# Patient Record
Sex: FEMALE | Race: WHITE
[De-identification: names, ages, dates, MRNs, and addresses within clinical notes are randomized per-mention and may not be internally consistent; named-entity substitution may affect disease eponyms.]

---

## 2021-10-03 ENCOUNTER — HOSPITAL ENCOUNTER (EMERGENCY)
Dept: HOSPITAL 4 - SED | Age: 41
LOS: 1 days | Discharge: HOME | End: 2021-10-04
Payer: COMMERCIAL

## 2021-10-03 VITALS — SYSTOLIC BLOOD PRESSURE: 150 MMHG

## 2021-10-03 VITALS — BODY MASS INDEX: 33.6 KG/M2 | HEIGHT: 71 IN | WEIGHT: 240 LBS

## 2021-10-03 DIAGNOSIS — K21.9: ICD-10-CM

## 2021-10-03 DIAGNOSIS — Z79.899: ICD-10-CM

## 2021-10-03 DIAGNOSIS — R60.0: Primary | ICD-10-CM

## 2021-10-03 LAB
ALBUMIN SERPL BCP-MCNC: 4 G/DL (ref 3.4–4.8)
ALT SERPL W P-5'-P-CCNC: 27 U/L (ref 12–78)
ANION GAP SERPL CALCULATED.3IONS-SCNC: 9 MMOL/L (ref 5–15)
AST SERPL W P-5'-P-CCNC: 27 U/L (ref 10–37)
BASOPHILS # BLD AUTO: 0.1 K/UL (ref 0–0.2)
BASOPHILS NFR BLD AUTO: 1.2 % (ref 0–2)
BILIRUB SERPL-MCNC: 0.6 MG/DL (ref 0–1)
BUN SERPL-MCNC: 8 MG/DL (ref 8–21)
CALCIUM SERPL-MCNC: 9.2 MG/DL (ref 8.4–11)
CHLORIDE SERPL-SCNC: 103 MMOL/L (ref 98–107)
CREAT SERPL-MCNC: 0.9 MG/DL (ref 0.55–1.3)
EOSINOPHIL # BLD AUTO: 0.3 K/UL (ref 0–0.4)
EOSINOPHIL NFR BLD AUTO: 3.3 % (ref 0–4)
ERYTHROCYTE [DISTWIDTH] IN BLOOD BY AUTOMATED COUNT: 13.4 % (ref 9–15)
GFR SERPL CREATININE-BSD FRML MDRD: 88 ML/MIN (ref 90–?)
GLUCOSE SERPL-MCNC: 94 MG/DL (ref 70–99)
HCT VFR BLD AUTO: 37.9 % (ref 36–48)
HGB BLD-MCNC: 13 G/DL (ref 12–16)
INR PPP: 1.1 (ref 0.8–1.2)
LYMPHOCYTES # BLD AUTO: 2.1 K/UL (ref 1–5.5)
LYMPHOCYTES NFR BLD AUTO: 26.6 % (ref 20.5–51.5)
MCH RBC QN AUTO: 31 PG (ref 27–31)
MCHC RBC AUTO-ENTMCNC: 35 % (ref 32–36)
MCV RBC AUTO: 89 FL (ref 79–98)
MONOCYTES # BLD MANUAL: 0.4 K/UL (ref 0–1)
MONOCYTES # BLD MANUAL: 5.4 % (ref 1.7–9.3)
NEUTROPHILS # BLD AUTO: 5 K/UL (ref 1.8–7.7)
NEUTROPHILS NFR BLD AUTO: 63.5 % (ref 40–70)
PLATELET # BLD AUTO: 329 K/UL (ref 130–430)
POTASSIUM SERPL-SCNC: 2.7 MMOL/L (ref 3.5–5.1)
PROTHROMBIN TIME: 11.3 SECS (ref 9.5–12.5)
RBC # BLD AUTO: 4.26 MIL/UL (ref 4.2–6.2)
SODIUM SERPLBLD-SCNC: 142 MMOL/L (ref 136–145)
WBC # BLD AUTO: 7.8 K/UL (ref 4.8–10.8)

## 2021-10-04 VITALS — SYSTOLIC BLOOD PRESSURE: 150 MMHG

## 2021-12-13 ENCOUNTER — HOSPITAL ENCOUNTER (EMERGENCY)
Dept: HOSPITAL 4 - SED | Age: 41
Discharge: HOME | End: 2021-12-13
Payer: COMMERCIAL

## 2021-12-13 VITALS — WEIGHT: 245 LBS | HEIGHT: 71 IN | BODY MASS INDEX: 34.3 KG/M2

## 2021-12-13 VITALS — SYSTOLIC BLOOD PRESSURE: 154 MMHG

## 2021-12-13 VITALS — SYSTOLIC BLOOD PRESSURE: 133 MMHG

## 2021-12-13 DIAGNOSIS — L03.115: Primary | ICD-10-CM

## 2021-12-13 DIAGNOSIS — Z79.899: ICD-10-CM

## 2021-12-13 DIAGNOSIS — K21.9: ICD-10-CM

## 2021-12-13 PROCEDURE — 99283 EMERGENCY DEPT VISIT LOW MDM: CPT

## 2021-12-13 PROCEDURE — 96372 THER/PROPH/DIAG INJ SC/IM: CPT

## 2021-12-13 NOTE — NUR
Patient given written and verbal discharge instructions and verbalizes 
understanding.  ER MD discussed with patient the results and treatment 
provided. Patient in stable condition. ID arm band removed. 

 Patient educated on pain management and to follow up with PMD. Pain Scale .

Opportunity for questions provided and answered. Medication side effect fact 
sheet provided.
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
## 2021-12-16 ENCOUNTER — HOSPITAL ENCOUNTER (EMERGENCY)
Dept: HOSPITAL 4 - SED | Age: 41
Discharge: HOME | End: 2021-12-16
Payer: COMMERCIAL

## 2021-12-16 VITALS — HEIGHT: 71 IN | WEIGHT: 220 LBS | BODY MASS INDEX: 30.8 KG/M2

## 2021-12-16 VITALS — SYSTOLIC BLOOD PRESSURE: 160 MMHG

## 2021-12-16 VITALS — SYSTOLIC BLOOD PRESSURE: 168 MMHG

## 2021-12-16 DIAGNOSIS — F31.9: ICD-10-CM

## 2021-12-16 DIAGNOSIS — Z79.899: ICD-10-CM

## 2021-12-16 DIAGNOSIS — Z76.0: ICD-10-CM

## 2021-12-16 DIAGNOSIS — K21.9: ICD-10-CM

## 2021-12-16 DIAGNOSIS — F41.9: Primary | ICD-10-CM

## 2021-12-26 ENCOUNTER — HOSPITAL ENCOUNTER (INPATIENT)
Dept: HOSPITAL 4 - SED | Age: 41
LOS: 5 days | Discharge: HOME | DRG: 392 | End: 2021-12-31
Attending: FAMILY MEDICINE | Admitting: FAMILY MEDICINE
Payer: COMMERCIAL

## 2021-12-26 VITALS — BODY MASS INDEX: 30.1 KG/M2 | HEIGHT: 71 IN | WEIGHT: 215 LBS

## 2021-12-26 DIAGNOSIS — I10: ICD-10-CM

## 2021-12-26 DIAGNOSIS — Z20.822: ICD-10-CM

## 2021-12-26 DIAGNOSIS — Z91.19: ICD-10-CM

## 2021-12-26 DIAGNOSIS — Z79.899: ICD-10-CM

## 2021-12-26 DIAGNOSIS — K52.9: Primary | ICD-10-CM

## 2021-12-26 DIAGNOSIS — F41.9: ICD-10-CM

## 2021-12-26 DIAGNOSIS — N39.0: ICD-10-CM

## 2021-12-26 DIAGNOSIS — E87.2: ICD-10-CM

## 2021-12-26 DIAGNOSIS — F31.9: ICD-10-CM

## 2021-12-26 DIAGNOSIS — Z59.00: ICD-10-CM

## 2021-12-26 DIAGNOSIS — L03.90: ICD-10-CM

## 2021-12-26 DIAGNOSIS — K21.9: ICD-10-CM

## 2021-12-26 LAB
ALBUMIN SERPL BCP-MCNC: 3.8 G/DL (ref 3.4–4.8)
ALT SERPL W P-5'-P-CCNC: 36 U/L (ref 12–78)
AMYLASE SERPL-CCNC: 77 U/L (ref 0–100)
ANION GAP SERPL CALCULATED.3IONS-SCNC: 13 MMOL/L (ref 5–15)
AST SERPL W P-5'-P-CCNC: 34 U/L (ref 10–37)
BASOPHILS # BLD AUTO: 0.1 K/UL (ref 0–0.2)
BASOPHILS NFR BLD AUTO: 0.3 % (ref 0–2)
BILIRUB SERPL-MCNC: 0.5 MG/DL (ref 0–1)
BUN SERPL-MCNC: 9 MG/DL (ref 8–21)
CALCIUM SERPL-MCNC: 9.1 MG/DL (ref 8.4–11)
CHLORIDE SERPL-SCNC: 102 MMOL/L (ref 98–107)
CREAT SERPL-MCNC: 0.85 MG/DL (ref 0.55–1.3)
CRP SERPL-MCNC: < 0.2 MG/DL (ref 0–0.5)
EOSINOPHIL # BLD AUTO: 0.1 K/UL (ref 0–0.4)
EOSINOPHIL NFR BLD AUTO: 0.3 % (ref 0–4)
ERYTHROCYTE [DISTWIDTH] IN BLOOD BY AUTOMATED COUNT: 14 % (ref 9–15)
GFR SERPL CREATININE-BSD FRML MDRD: 94 ML/MIN (ref 90–?)
GLUCOSE SERPL-MCNC: 155 MG/DL (ref 70–99)
HCT VFR BLD AUTO: 39.6 % (ref 36–48)
HGB BLD-MCNC: 13.2 G/DL (ref 12–16)
LACTATE SERPL-SCNC: 160 U/L (ref 81–234)
LIPASE SERPL-CCNC: 147 U/L (ref 73–393)
LYMPHOCYTES # BLD AUTO: 1.5 K/UL (ref 1–5.5)
LYMPHOCYTES NFR BLD AUTO: 9.2 % (ref 20.5–51.5)
MCH RBC QN AUTO: 29 PG (ref 27–31)
MCHC RBC AUTO-ENTMCNC: 33 % (ref 32–36)
MCV RBC AUTO: 88 FL (ref 79–98)
MONOCYTES # BLD MANUAL: 0.5 K/UL (ref 0–1)
MONOCYTES # BLD MANUAL: 2.8 % (ref 1.7–9.3)
NEUTROPHILS # BLD AUTO: 14.4 K/UL (ref 1.8–7.7)
NEUTROPHILS NFR BLD AUTO: 87.4 % (ref 40–70)
PLATELET # BLD AUTO: 372 K/UL (ref 130–430)
POTASSIUM SERPL-SCNC: 3.2 MMOL/L (ref 3.5–5.1)
RBC # BLD AUTO: 4.5 MIL/UL (ref 4.2–6.2)
SODIUM SERPLBLD-SCNC: 137 MMOL/L (ref 136–145)
WBC # BLD AUTO: 16.5 K/UL (ref 4.8–10.8)

## 2021-12-26 PROCEDURE — G0482 DRUG TEST DEF 15-21 CLASSES: HCPCS

## 2021-12-26 SDOH — ECONOMIC STABILITY - HOUSING INSECURITY: HOMELESSNESS UNSPECIFIED: Z59.00

## 2021-12-26 NOTE — NUR
PT BIB MOTHER FOR NAUSEA, VOMITING AND DIARHEA SINCE 1730 TODAY. PT ATE IN N 
OUT AT AROUND 1700 AND STARTED HAVING SEVERE DIARHEA AND VOMITING. PT STATES 
OVER 5 VOMITING EPISODES AND DIARHEA FOR 6 EPISODES. A&OX4, -CP, -SOB.

## 2021-12-27 VITALS — SYSTOLIC BLOOD PRESSURE: 151 MMHG

## 2021-12-27 VITALS — SYSTOLIC BLOOD PRESSURE: 158 MMHG

## 2021-12-27 VITALS — SYSTOLIC BLOOD PRESSURE: 148 MMHG

## 2021-12-27 LAB
AMORPH SED URNS QL MICRO: (no result) /HPF
AMPHETAMINES UR QL SCN: NEGATIVE
APPEARANCE UR: (no result)
BACTERIA URNS QL MICRO: (no result) /HPF
BARBITURATES UR QL SCN: NEGATIVE
BENZODIAZ UR QL SCN: POSITIVE
BILIRUB UR QL STRIP: NEGATIVE
BZE UR QL SCN: NEGATIVE
CANNABINOIDS UR QL SCN: POSITIVE
COLOR UR: YELLOW
GLUCOSE UR STRIP-MCNC: NEGATIVE MG/DL
HGB UR QL STRIP: NEGATIVE
KETONES UR STRIP-MCNC: NEGATIVE MG/DL
LEUKOCYTE ESTERASE UR QL STRIP: NEGATIVE
METHADONE UR-SCNC: NEGATIVE UMOL/L
METHAMPHET UR-SCNC: NEGATIVE UMOL/L
NITRITE UR QL STRIP: NEGATIVE
OPIATES UR QL SCN: NEGATIVE
OXYCODONE SERPL-MCNC: NEGATIVE NG/ML
PCP UR QL SCN: NEGATIVE
PH UR STRIP: 8 [PH] (ref 5–8)
PROT UR QL STRIP: NEGATIVE
RBC #/AREA URNS HPF: (no result) /HPF (ref 0–3)
SP GR UR STRIP: 1.02 (ref 1–1.03)
TRICYCLICS UR-MCNC: NEGATIVE NG/ML
URINE PROPOXYPHENE SCREEN: NEGATIVE
UROBILINOGEN UR STRIP-MCNC: 0.2 MG/DL (ref 0.2–1)
WBC #/AREA URNS HPF: (no result) /HPF (ref 0–3)

## 2021-12-27 RX ADMIN — ENOXAPARIN SODIUM SCH MG: 40 INJECTION SUBCUTANEOUS at 20:45

## 2021-12-27 RX ADMIN — ONDANSETRON PRN MG: 2 INJECTION INTRAMUSCULAR; INTRAVENOUS at 20:42

## 2021-12-27 RX ADMIN — DEXTROSE MONOHYDRATE SCH MLS/HR: 50 INJECTION, SOLUTION INTRAVENOUS at 15:56

## 2021-12-27 RX ADMIN — VANCOMYCIN HYDROCHLORIDE SCH MG: 1 INJECTION, POWDER, LYOPHILIZED, FOR SOLUTION INTRAVENOUS at 23:15

## 2021-12-27 RX ADMIN — POTASSIUM CHLORIDE, DEXTROSE MONOHYDRATE AND SODIUM CHLORIDE SCH MLS/HR: 150; 5; 450 INJECTION, SOLUTION INTRAVENOUS at 11:12

## 2021-12-27 RX ADMIN — Medication SCH MLS/HR: at 20:57

## 2021-12-27 RX ADMIN — POTASSIUM CHLORIDE, DEXTROSE MONOHYDRATE AND SODIUM CHLORIDE SCH MLS/HR: 150; 5; 450 INJECTION, SOLUTION INTRAVENOUS at 05:22

## 2021-12-27 RX ADMIN — Medication SCH MLS/HR: at 15:56

## 2021-12-27 RX ADMIN — TRAZODONE HYDROCHLORIDE PRN MG: 50 TABLET ORAL at 20:55

## 2021-12-27 NOTE — NUR
REPORT RECEIVED, PT WITH EYES CLOSED, EASILY AROUSBALE TO VOICE. PT DENIES ANY 
DIZZINES/SOB OR CP. SKIN W/D/I, DENIES ANY PAIN, BUT REPORTS MILD NAUSEA. 
REPORTTS LAST TIME SHE VOMITTED WAS LAST NIGHT. RESP EVEN AND UNLABORED, ON RA 
@95%. REPOSITIOND IN BED, IV FLUIDS INFUSING VIA PUMP AS ORDERED. SAFTEY 
PRECAUTIONS IN PLACE, WILL CONT TO MONITOR.

## 2021-12-27 NOTE — NUR
APPEARED ALERT, ORIENTED, AND APPROPRIATE.  ASKING FOR FOOD, OFFERED LIQUID DIET TO BEGIN 
WITH, DID NOT EAT, JUST DRANK SOME JUICE.

IVF STARTS RIGHT  AWAY ON ARRIVAL, CLAIMED SHE HAS HAD DIARRHEA X 3 DAYS,  NEEDS STOOL 
SPECIMEN, SO FAR SHE DOES NOT HAVE A BM YET,

PER ID, ALL ABX ( ROCEPHIN, AND FLAGYL IVPB INITIATED).  PATIENT DID REQUEST  TRAZODONE FOR 
TONITE, WHICH HAS BEEN ORDERED, PATIENT MADE AWARE.

ON BED REST

## 2021-12-27 NOTE — NUR
Opening notes



Pt AAOx 4, VSS, afebrile.  No s/s distress noted.  Pt has N/V, yellow emesis noted.  Pt on 
clear liquid diet.  Will medicate with Zofran as needed.  Pt denies diarrhea.  IVF infusing 
at ordered rate L. AC 20G clear and patent.  Call light within reach.  To monitor.

## 2021-12-27 NOTE — NUR
Patient will be admitted to care of D JEANINE.  Admitted to  unit.  Will go to 
room .  Belongings list completed.  Complete and up to date summary report 
printed. SBAR report to be given at bedside with opportunity for questions.

## 2021-12-27 NOTE — NUR
pt being transferred by [facility] ambulance to [place] for a ct scan with 
contrast

## 2021-12-27 NOTE — NUR
CONSULTATION PAGED/CALLED

Reason for Consultation: []  ACUTE COLITIS

Person Who was Notified: []  COLTON

Consulting Physician: []  DR MCGRATH

Consultant Specialty: []  ID

Ordering Physician: []  DR EWING

## 2021-12-27 NOTE — NUR
REPORT GIVEN TO RICHARD LARSON, UPDATED ON STATUS, LABS AND VITALS. PT STABLE FR 
TRANSFER. VSS. PT GOT UP TO BATHROOM WITH STEADY GAIT. DENIES ANY 
DIARRHEA.VOMITIING.

## 2021-12-27 NOTE — NUR
PATI NOTIFIED [facility], WHICH IS NOT FORMULARY IN THIS HOSPITAL.  CALLED PATIENT'S 
MOTHER TO BRING IT TO THE HOSPITAL.  PATIENT REALLY NEEDS IT

## 2021-12-28 VITALS — SYSTOLIC BLOOD PRESSURE: 148 MMHG

## 2021-12-28 VITALS — SYSTOLIC BLOOD PRESSURE: 168 MMHG

## 2021-12-28 VITALS — SYSTOLIC BLOOD PRESSURE: 173 MMHG

## 2021-12-28 VITALS — SYSTOLIC BLOOD PRESSURE: 109 MMHG

## 2021-12-28 VITALS — SYSTOLIC BLOOD PRESSURE: 134 MMHG

## 2021-12-28 LAB
ANION GAP SERPL CALCULATED.3IONS-SCNC: 11 MMOL/L (ref 5–15)
BASOPHILS # BLD AUTO: 0.1 K/UL (ref 0–0.2)
BASOPHILS NFR BLD AUTO: 0.3 % (ref 0–2)
BUN SERPL-MCNC: 5 MG/DL (ref 8–21)
CALCIUM SERPL-MCNC: 8.4 MG/DL (ref 8.4–11)
CHLORIDE SERPL-SCNC: 101 MMOL/L (ref 98–107)
CREAT SERPL-MCNC: 0.55 MG/DL (ref 0.55–1.3)
EOSINOPHIL # BLD AUTO: 0 K/UL (ref 0–0.4)
EOSINOPHIL NFR BLD AUTO: 0.1 % (ref 0–4)
ERYTHROCYTE [DISTWIDTH] IN BLOOD BY AUTOMATED COUNT: 14.1 % (ref 9–15)
GFR SERPL CREATININE-BSD FRML MDRD: 156 ML/MIN (ref 90–?)
GLUCOSE SERPL-MCNC: 121 MG/DL (ref 70–99)
HCT VFR BLD AUTO: 36.9 % (ref 36–48)
HGB BLD-MCNC: 12.5 G/DL (ref 12–16)
LYMPHOCYTES # BLD AUTO: 1.6 K/UL (ref 1–5.5)
LYMPHOCYTES NFR BLD AUTO: 10.3 % (ref 20.5–51.5)
MCH RBC QN AUTO: 30 PG (ref 27–31)
MCHC RBC AUTO-ENTMCNC: 34 % (ref 32–36)
MCV RBC AUTO: 87 FL (ref 79–98)
MONOCYTES # BLD MANUAL: 0.6 K/UL (ref 0–1)
MONOCYTES # BLD MANUAL: 3.9 % (ref 1.7–9.3)
NEUTROPHILS # BLD AUTO: 13.7 K/UL (ref 1.8–7.7)
NEUTROPHILS NFR BLD AUTO: 85.4 % (ref 40–70)
PLATELET # BLD AUTO: 324 K/UL (ref 130–430)
POTASSIUM SERPL-SCNC: 3.1 MMOL/L (ref 3.5–5.1)
RBC # BLD AUTO: 4.23 MIL/UL (ref 4.2–6.2)
SODIUM SERPLBLD-SCNC: 137 MMOL/L (ref 136–145)
WBC # BLD AUTO: 16 K/UL (ref 4.8–10.8)

## 2021-12-28 RX ADMIN — Medication SCH MLS/HR: at 06:07

## 2021-12-28 RX ADMIN — VANCOMYCIN HYDROCHLORIDE SCH MG: 1 INJECTION, POWDER, LYOPHILIZED, FOR SOLUTION INTRAVENOUS at 11:05

## 2021-12-28 RX ADMIN — VANCOMYCIN HYDROCHLORIDE SCH MG: 1 INJECTION, POWDER, LYOPHILIZED, FOR SOLUTION INTRAVENOUS at 06:09

## 2021-12-28 RX ADMIN — POTASSIUM CHLORIDE, DEXTROSE MONOHYDRATE AND SODIUM CHLORIDE SCH MLS/HR: 150; 5; 450 INJECTION, SOLUTION INTRAVENOUS at 06:08

## 2021-12-28 RX ADMIN — POTASSIUM CHLORIDE, DEXTROSE MONOHYDRATE AND SODIUM CHLORIDE SCH MLS/HR: 150; 5; 450 INJECTION, SOLUTION INTRAVENOUS at 23:48

## 2021-12-28 RX ADMIN — ONDANSETRON PRN MG: 2 INJECTION INTRAMUSCULAR; INTRAVENOUS at 10:59

## 2021-12-28 RX ADMIN — POTASSIUM CHLORIDE, DEXTROSE MONOHYDRATE AND SODIUM CHLORIDE SCH MLS/HR: 150; 5; 450 INJECTION, SOLUTION INTRAVENOUS at 15:47

## 2021-12-28 RX ADMIN — Medication SCH MLS/HR: at 21:12

## 2021-12-28 RX ADMIN — ENOXAPARIN SODIUM SCH MG: 40 INJECTION SUBCUTANEOUS at 21:13

## 2021-12-28 RX ADMIN — VANCOMYCIN HYDROCHLORIDE SCH MG: 1 INJECTION, POWDER, LYOPHILIZED, FOR SOLUTION INTRAVENOUS at 23:15

## 2021-12-28 RX ADMIN — DEXTROSE MONOHYDRATE SCH MLS/HR: 50 INJECTION, SOLUTION INTRAVENOUS at 12:02

## 2021-12-28 RX ADMIN — VANCOMYCIN HYDROCHLORIDE SCH MG: 1 INJECTION, POWDER, LYOPHILIZED, FOR SOLUTION INTRAVENOUS at 17:58

## 2021-12-28 RX ADMIN — ONDANSETRON PRN MG: 2 INJECTION INTRAMUSCULAR; INTRAVENOUS at 01:43

## 2021-12-28 RX ADMIN — VANCOMYCIN HYDROCHLORIDE SCH MG: 1 INJECTION, POWDER, LYOPHILIZED, FOR SOLUTION INTRAVENOUS at 17:15

## 2021-12-28 RX ADMIN — Medication SCH MLS/HR: at 15:46

## 2021-12-28 RX ADMIN — ONDANSETRON PRN MG: 2 INJECTION INTRAMUSCULAR; INTRAVENOUS at 17:58

## 2021-12-28 RX ADMIN — POTASSIUM CHLORIDE, DEXTROSE MONOHYDRATE AND SODIUM CHLORIDE SCH MLS/HR: 150; 5; 450 INJECTION, SOLUTION INTRAVENOUS at 03:00

## 2021-12-28 RX ADMIN — ONDANSETRON PRN MG: 2 INJECTION INTRAMUSCULAR; INTRAVENOUS at 06:40

## 2021-12-28 NOTE — NUR
Closing notes



Pt AAOx3, no s/s distress noted.  Pt medicated with nausea med Zofran 4mg IVP.  
IVF/antibiotic infusing at ordered rate L. AC no s/s infiltration.  Call light within reach. 
 Bed low, locked, siderails up x2.  Safety maintained.  To endorse to AM nurse.

## 2021-12-28 NOTE — NUR
ANXIOUS

Pt c/o feeling very anxious-pt given Vistaril as ordered. Light turned down low and curtain 
pulled to promote rest. Call light within reach.

## 2021-12-28 NOTE — NUR
INITIAL ROUNDS

Received pt AAOx4, no s/s resp distress, no c/o pain or discomfort. IVF infusing well to LAC 
at ordered rate with no s/s infiltration to site. Plan of care for the day reviewed with 
pt-she verbalized her understanding. Pain management, disease process, skin and safety 
discussed-teach back done. Call light within reach.

## 2021-12-28 NOTE — NUR
Opening notes



Pt alert, awake, calm, no s/s distress noted.  Pt denies N/V at this time.  IVF infusing at 
ordered rate L. AC 20 clear and patent.  Pt maintained on clear liquid diet.  Awaiting stool 
sample.  Call light within reach.  To monitor.

## 2021-12-28 NOTE — NUR
Zofran 



Pt awake, c/o N/V, medicated with Zofran 4mg IVP as needed per pt request.  Educated pt to 
keep L. arm straight for IV fluid infusion, pt demonstrated understanding.  To monitor

## 2021-12-28 NOTE — NUR
CLOSING NOTE

Pt resting quietly in bed with no s/s resp distress, no c/o pain, no further c/o nausea-pt 
given Zofran at 1758. Pt still refused the Vanco liquid, pt stated " I don't want to start 
throwing up again and I finely feel a little better". IVF infusing well to LAC at ordered 
rate with no s/s infiltration to site. Pt encouraged to try to eat her dinner-pt stated "I'm 
going to try the broth". Call light within reach.

## 2021-12-29 VITALS — SYSTOLIC BLOOD PRESSURE: 124 MMHG

## 2021-12-29 VITALS — SYSTOLIC BLOOD PRESSURE: 96 MMHG

## 2021-12-29 VITALS — SYSTOLIC BLOOD PRESSURE: 137 MMHG

## 2021-12-29 VITALS — SYSTOLIC BLOOD PRESSURE: 105 MMHG

## 2021-12-29 VITALS — SYSTOLIC BLOOD PRESSURE: 114 MMHG

## 2021-12-29 LAB
ANION GAP SERPL CALCULATED.3IONS-SCNC: 8 MMOL/L (ref 5–15)
BASOPHILS # BLD AUTO: 0.1 K/UL (ref 0–0.2)
BASOPHILS NFR BLD AUTO: 1.2 % (ref 0–2)
BUN SERPL-MCNC: 8 MG/DL (ref 8–21)
CALCIUM SERPL-MCNC: 8.8 MG/DL (ref 8.4–11)
CHLORIDE SERPL-SCNC: 101 MMOL/L (ref 98–107)
CREAT SERPL-MCNC: 0.89 MG/DL (ref 0.55–1.3)
EOSINOPHIL # BLD AUTO: 0.1 K/UL (ref 0–0.4)
EOSINOPHIL NFR BLD AUTO: 1.5 % (ref 0–4)
ERYTHROCYTE [DISTWIDTH] IN BLOOD BY AUTOMATED COUNT: 14 % (ref 9–15)
GFR SERPL CREATININE-BSD FRML MDRD: 89 ML/MIN (ref 90–?)
GLUCOSE SERPL-MCNC: 80 MG/DL (ref 70–99)
HCT VFR BLD AUTO: 42.2 % (ref 36–48)
HGB BLD-MCNC: 14.2 G/DL (ref 12–16)
LYMPHOCYTES # BLD AUTO: 1.9 K/UL (ref 1–5.5)
LYMPHOCYTES NFR BLD AUTO: 25.6 % (ref 20.5–51.5)
MCH RBC QN AUTO: 30 PG (ref 27–31)
MCHC RBC AUTO-ENTMCNC: 34 % (ref 32–36)
MCV RBC AUTO: 88 FL (ref 79–98)
MONOCYTES # BLD MANUAL: 0.5 K/UL (ref 0–1)
MONOCYTES # BLD MANUAL: 7.1 % (ref 1.7–9.3)
NEUTROPHILS # BLD AUTO: 4.9 K/UL (ref 1.8–7.7)
NEUTROPHILS NFR BLD AUTO: 64.6 % (ref 40–70)
PLATELET # BLD AUTO: 295 K/UL (ref 130–430)
POTASSIUM SERPL-SCNC: 3.6 MMOL/L (ref 3.5–5.1)
RBC # BLD AUTO: 4.8 MIL/UL (ref 4.2–6.2)
SODIUM SERPLBLD-SCNC: 137 MMOL/L (ref 136–145)
WBC # BLD AUTO: 7.6 K/UL (ref 4.8–10.8)

## 2021-12-29 RX ADMIN — VANCOMYCIN HYDROCHLORIDE SCH MG: 1 INJECTION, POWDER, LYOPHILIZED, FOR SOLUTION INTRAVENOUS at 16:26

## 2021-12-29 RX ADMIN — POTASSIUM CHLORIDE, DEXTROSE MONOHYDRATE AND SODIUM CHLORIDE SCH MLS/HR: 150; 5; 450 INJECTION, SOLUTION INTRAVENOUS at 05:00

## 2021-12-29 RX ADMIN — Medication SCH MLS/HR: at 05:25

## 2021-12-29 RX ADMIN — TRAZODONE HYDROCHLORIDE PRN MG: 50 TABLET ORAL at 20:50

## 2021-12-29 RX ADMIN — VANCOMYCIN HYDROCHLORIDE SCH MG: 1 INJECTION, POWDER, LYOPHILIZED, FOR SOLUTION INTRAVENOUS at 23:15

## 2021-12-29 RX ADMIN — ACETAMINOPHEN PRN MG: 325 TABLET ORAL at 15:17

## 2021-12-29 RX ADMIN — ACETAMINOPHEN PRN MG: 325 TABLET ORAL at 05:25

## 2021-12-29 RX ADMIN — ENOXAPARIN SODIUM SCH MG: 40 INJECTION SUBCUTANEOUS at 20:52

## 2021-12-29 RX ADMIN — Medication SCH MLS/HR: at 15:14

## 2021-12-29 RX ADMIN — Medication SCH MLS/HR: at 20:50

## 2021-12-29 RX ADMIN — DEXTROSE MONOHYDRATE SCH MLS/HR: 50 INJECTION, SOLUTION INTRAVENOUS at 12:25

## 2021-12-29 RX ADMIN — VANCOMYCIN HYDROCHLORIDE SCH MG: 1 INJECTION, POWDER, LYOPHILIZED, FOR SOLUTION INTRAVENOUS at 11:15

## 2021-12-29 RX ADMIN — VANCOMYCIN HYDROCHLORIDE SCH MG: 1 INJECTION, POWDER, LYOPHILIZED, FOR SOLUTION INTRAVENOUS at 05:15

## 2021-12-29 NOTE — NUR
Notes

Patient is ambulating in her room, steady gait. No resp distress noted. breathing is even 
and unlabored. Pt denies any pain. Will continue to monitor.

## 2021-12-29 NOTE — NUR
Opening Notes

Patient is awake, alert and oriented x4. No resp distress noted. Breathing is even and 
unlabored. Pt denies any pain at this time. IV site on left AC 20 gauge intact at this time. 
KCl 20 mEq + D5 1/2 NS @ 125 cc/hr, infusing well. Pt is ambulatory steady gait. Pt denies 
any N/V at this time. Pt reports continued diarrhea but less frequent episodes. Pt reports 
that Vanco oral solution makes her nauseated. Pt also report intermittent bloody drainage 
from her belly button, will follow up with MD Pedroza. All needs met at this time. Safety 
and fall precautions in place. Bed in lowest position, locked. Will continue to monitor.

## 2021-12-29 NOTE — NUR
Closing notes/Headache



Pt awake, c/o headache.  Medicated with Tylenol 2 tabs PO as needed.  Pt denies any 
nausea/vomitting.  IVF/abx infusing L. AC 20G clear and patent.  Call light within reach.  
To endorse to AM nurse.

## 2021-12-29 NOTE — NUR
Notes/Anxiety 

Patient is feeling anxious at this time. Administered Vistaril 20 mg PO at 1225, tolerated 
well. No resp distress noted. Breathing is even and unlabored. Denies any pain. No reported 
nausea/diarrhea at this time. Will continue to monitor.

## 2021-12-29 NOTE — NUR
DIET

SPOKE WITH DR EWING PATIENT HAS NO VOMITING OR DIARRHEA AND IS REQUESTING TO INCREASE 
DIET.  NEW ORDERS RECEIVED

## 2021-12-29 NOTE — NUR
Tylenol 650 mg for menstrual cramps

Patient is c/o 8/10 abdominal cramp pain, requesting pain meds. Administered Tylenol 650 mg 
PO, tolerated well. Will follow up.

-------------------------------------------------------------------------------

Addendum: 12/29/21 at 1904 by Clarissa Aranda RN

-------------------------------------------------------------------------------

1615: per pt, Tylenol helped relieve menstrual cramps.

## 2021-12-29 NOTE — NUR
Patient awake alert Family is @ the bedside 

 patient Requesting the medication DESYREL 100 MG po for sleep aide .

## 2021-12-29 NOTE — NUR
Closing Notes

Patient is awake, alert and oriented x4. Pt is feeling anxious at this time. Nurse notified 
patient that her anxiety medication is not due for another 2 hours. Per patient, "Its okay, 
I can wait." Will endorse to next nurse to follow up. No resp distress noted. Breathing is 
even and unlabored. Pt denies any pain at this time. IV site on left AC 20 gauge intact, Kcl 
20 mEq + D5 1/2 NS @ 125 cc/hr, infusing well at this time. Pt reports no diarrhea/nausea 
episodes today during shift. Pt diet upgraded to full liquid at this time. All needs met at 
this time. Safety and fall precautions in place. Bed in lowest position, locked. Will 
continue to monitor.

## 2021-12-29 NOTE — NUR
Notes

Patient is relaxing in bed and talking on her phone. No resp distress noted. Breathing is 
even and unlabored. Denies any pain. Will continue to monitor.

## 2021-12-29 NOTE — NUR
Notes

Patient is sleeping at this time. No resp distress noted. Breathing is even and unlabored. 
Denies any pain. Will continue to monitor.

## 2021-12-30 VITALS — SYSTOLIC BLOOD PRESSURE: 104 MMHG

## 2021-12-30 VITALS — SYSTOLIC BLOOD PRESSURE: 126 MMHG

## 2021-12-30 VITALS — SYSTOLIC BLOOD PRESSURE: 132 MMHG

## 2021-12-30 VITALS — SYSTOLIC BLOOD PRESSURE: 129 MMHG

## 2021-12-30 VITALS — SYSTOLIC BLOOD PRESSURE: 113 MMHG

## 2021-12-30 RX ADMIN — Medication SCH MLS/HR: at 21:26

## 2021-12-30 RX ADMIN — VANCOMYCIN HYDROCHLORIDE SCH MG: 1 INJECTION, POWDER, LYOPHILIZED, FOR SOLUTION INTRAVENOUS at 21:27

## 2021-12-30 RX ADMIN — DEXTROSE MONOHYDRATE SCH MLS/HR: 50 INJECTION, SOLUTION INTRAVENOUS at 12:10

## 2021-12-30 RX ADMIN — VANCOMYCIN HYDROCHLORIDE SCH MG: 1 INJECTION, POWDER, LYOPHILIZED, FOR SOLUTION INTRAVENOUS at 11:15

## 2021-12-30 RX ADMIN — POTASSIUM CHLORIDE, DEXTROSE MONOHYDRATE AND SODIUM CHLORIDE SCH MLS/HR: 150; 5; 450 INJECTION, SOLUTION INTRAVENOUS at 05:43

## 2021-12-30 RX ADMIN — Medication SCH MLS/HR: at 06:43

## 2021-12-30 RX ADMIN — ACETAMINOPHEN PRN MG: 325 TABLET ORAL at 09:13

## 2021-12-30 RX ADMIN — ENOXAPARIN SODIUM SCH MG: 40 INJECTION SUBCUTANEOUS at 21:28

## 2021-12-30 RX ADMIN — VANCOMYCIN HYDROCHLORIDE SCH MG: 1 INJECTION, POWDER, LYOPHILIZED, FOR SOLUTION INTRAVENOUS at 17:15

## 2021-12-30 RX ADMIN — Medication SCH MLS/HR: at 13:11

## 2021-12-30 RX ADMIN — VANCOMYCIN HYDROCHLORIDE SCH MG: 1 INJECTION, POWDER, LYOPHILIZED, FOR SOLUTION INTRAVENOUS at 05:15

## 2021-12-30 RX ADMIN — POTASSIUM CHLORIDE, DEXTROSE MONOHYDRATE AND SODIUM CHLORIDE SCH MLS/HR: 150; 5; 450 INJECTION, SOLUTION INTRAVENOUS at 15:08

## 2021-12-30 RX ADMIN — TRAZODONE HYDROCHLORIDE PRN MG: 50 TABLET ORAL at 21:26

## 2021-12-30 NOTE — NUR
Notes

Patient resting in bed watching TV. No pain or distress. Patient states no episodes of 
diarrhea since this morning. Call light in reach and bed in lowest position. Encouraged to 
call.

## 2021-12-30 NOTE — NUR
Initial Note

Patient awake, alert, and oriented x 4. Reports 3/10 pain due to menstrual cramping. 
Requesting Tylenol and Vistaril with A.M. medications. Will review and administer 
medications as ordered. Patient ambulatory without assist. Call light and bedside table 
within reach, bed locked in lowest position. Encouraged to call.

## 2021-12-30 NOTE — NUR
Closing Note

Patient resting in bed, mom at bedside. No pain or distress. Anxiety is managed with 
Vistaril per MD order. Call light and bedside table in reach, bed in lowest position. Will 
continue to monitor and endorse to night nurse.

## 2021-12-30 NOTE — NUR
Notes

Patient resting in bed, mom at bedside. No pain or distress observed. Needs addressed. 
Encouraged to call as needed.

## 2021-12-31 VITALS — SYSTOLIC BLOOD PRESSURE: 114 MMHG

## 2021-12-31 VITALS — SYSTOLIC BLOOD PRESSURE: 141 MMHG

## 2021-12-31 VITALS — SYSTOLIC BLOOD PRESSURE: 135 MMHG

## 2021-12-31 VITALS — SYSTOLIC BLOOD PRESSURE: 131 MMHG

## 2021-12-31 RX ADMIN — POTASSIUM CHLORIDE, DEXTROSE MONOHYDRATE AND SODIUM CHLORIDE SCH MLS/HR: 150; 5; 450 INJECTION, SOLUTION INTRAVENOUS at 02:42

## 2021-12-31 RX ADMIN — VANCOMYCIN HYDROCHLORIDE SCH MG: 1 INJECTION, POWDER, LYOPHILIZED, FOR SOLUTION INTRAVENOUS at 11:15

## 2021-12-31 RX ADMIN — POTASSIUM CHLORIDE, DEXTROSE MONOHYDRATE AND SODIUM CHLORIDE SCH MLS/HR: 150; 5; 450 INJECTION, SOLUTION INTRAVENOUS at 10:07

## 2021-12-31 RX ADMIN — VANCOMYCIN HYDROCHLORIDE SCH MG: 1 INJECTION, POWDER, LYOPHILIZED, FOR SOLUTION INTRAVENOUS at 05:15

## 2021-12-31 RX ADMIN — DEXTROSE MONOHYDRATE SCH MLS/HR: 50 INJECTION, SOLUTION INTRAVENOUS at 11:58

## 2021-12-31 RX ADMIN — Medication SCH MLS/HR: at 13:28

## 2021-12-31 RX ADMIN — Medication SCH MLS/HR: at 06:10

## 2021-12-31 NOTE — NUR
ROUNDING

Patient currently eating lunch in bed. No complaints of pain and/or distress. All needs met. 
Will continue to monitor.

## 2021-12-31 NOTE — NUR
OPENING NOTE

Received shift report from night shift RN. Patient AxOx4 currently resting in bed and 
respirations remain even and non-labored on room air. IV is patent and infusing fluids as 
ordered. Bed locked in lowest position and call light is within reach. Will continue to 
monitor.

## 2021-12-31 NOTE — NUR
Hourly Rounding 

 assist patient out of bed to Rest Room ambulates with ASSIST fall measures implemented no 
SOB noted activity tolerated / .

## 2021-12-31 NOTE — NUR
D/C Patient

Patient given medication reconciliation form and D/C instructions. Exit Care provided. 
Patient verbalized understanding. MD discussed with patient the results and treatment 
provided. Ambulatory with steady gait for discharge to home. Patient in stable condition, ID 
band removed. IV catheter removed, intact and dressing applied, no active bleeding. Written 
Rx given. Patient educated on pain management. All belongings sent with patient.